# Patient Record
Sex: FEMALE | Race: WHITE | Employment: PART TIME | URBAN - NONMETROPOLITAN AREA
[De-identification: names, ages, dates, MRNs, and addresses within clinical notes are randomized per-mention and may not be internally consistent; named-entity substitution may affect disease eponyms.]

---
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
## 2019-01-14 ENCOUNTER — OFFICE VISIT (OUTPATIENT)
Dept: NEUROLOGY | Age: 19
End: 2019-01-14
Payer: COMMERCIAL

## 2019-01-14 VITALS
DIASTOLIC BLOOD PRESSURE: 68 MMHG | WEIGHT: 215 LBS | HEART RATE: 82 BPM | SYSTOLIC BLOOD PRESSURE: 118 MMHG | BODY MASS INDEX: 38.09 KG/M2 | HEIGHT: 63 IN

## 2019-01-14 DIAGNOSIS — G43.009 MIGRAINE WITHOUT AURA AND WITHOUT STATUS MIGRAINOSUS, NOT INTRACTABLE: Primary | ICD-10-CM

## 2019-01-14 PROCEDURE — G8417 CALC BMI ABV UP PARAM F/U: HCPCS | Performed by: PSYCHIATRY & NEUROLOGY

## 2019-01-14 PROCEDURE — 1036F TOBACCO NON-USER: CPT | Performed by: PSYCHIATRY & NEUROLOGY

## 2019-01-14 PROCEDURE — G8427 DOCREV CUR MEDS BY ELIG CLIN: HCPCS | Performed by: PSYCHIATRY & NEUROLOGY

## 2019-01-14 PROCEDURE — 99213 OFFICE O/P EST LOW 20 MIN: CPT | Performed by: PSYCHIATRY & NEUROLOGY

## 2019-01-14 PROCEDURE — G8484 FLU IMMUNIZE NO ADMIN: HCPCS | Performed by: PSYCHIATRY & NEUROLOGY

## 2019-01-14 RX ORDER — RIZATRIPTAN BENZOATE 10 MG/1
10 TABLET ORAL DAILY PRN
Qty: 9 TABLET | Refills: 3 | Status: SHIPPED | OUTPATIENT
Start: 2019-01-14 | End: 2019-10-18 | Stop reason: SDUPTHER

## 2019-06-04 ENCOUNTER — HOSPITAL ENCOUNTER (EMERGENCY)
Age: 19
Discharge: HOME | End: 2019-06-04
Payer: COMMERCIAL

## 2019-06-04 VITALS
TEMPERATURE: 97.8 F | WEIGHT: 210.6 LBS | HEART RATE: 90 BPM | DIASTOLIC BLOOD PRESSURE: 94 MMHG | OXYGEN SATURATION: 99 % | SYSTOLIC BLOOD PRESSURE: 148 MMHG | BODY MASS INDEX: 37.32 KG/M2 | HEIGHT: 63 IN | RESPIRATION RATE: 16 BRPM

## 2019-06-04 DIAGNOSIS — B07.0 PLANTAR WART OF RIGHT FOOT: Primary | ICD-10-CM

## 2019-06-04 PROCEDURE — 99212 OFFICE O/P EST SF 10 MIN: CPT

## 2019-06-04 PROCEDURE — 99212 OFFICE O/P EST SF 10 MIN: CPT | Performed by: NURSE PRACTITIONER

## 2019-06-04 NOTE — ED TRIAGE NOTES
AMbualtory to room 2 c/o plantar right foot pain. Reports \" $ weeks ago I stepped on plastic. Not sure if I got it out\".   Right foot examined and area that resembles a plantar wart visible

## 2019-06-04 NOTE — ED PROVIDER NOTES
[facility]       CHIEF COMPLAINT       Chief Complaint   Patient presents with    Foot Pain     \" stepped on plastic right foot  4 weeks ago and now have pain\"       Nurses Notes reviewed and I agree except as noted in the HPI. HISTORY OF PRESENT ILLNESS   Avelino Palumbo is a 22 y.o. female who presents to the urgent care center complaining of pain to the ball of the right foot. The patient states that she stepped on a piece of plastic barefooted 4 weeks ago and has had pain ever since that time. The patient states at that time it did bleed but states that she has had pain ever since that time. The patient has not taken anything for pain. The patient denies any pain at rest just with weightbearing. The patient has not taken anything for pain not done any thing for pain as well. The patient does ambulate to the room and in the hallway without any limp gait is stable. The history is provided by the patient. No  was used. Foot Pain   This is a new problem. Episode onset: 4 weeks ago. The problem occurs constantly. The problem has been gradually worsening. Nothing aggravates the symptoms. Nothing relieves the symptoms. She has tried nothing for the symptoms. The treatment provided no relief. REVIEW OF SYSTEMS     Review of Systems   Constitutional: Negative for activity change. Musculoskeletal: Negative for arthralgias, gait problem and joint swelling. Pain to the right forefoot       PAST MEDICAL HISTORY         Diagnosis Date    Headache        SURGICALHISTORY     Patient  has no past surgical history on file.     CURRENT MEDICATIONS       Discharge Medication List as of 6/4/2019  2:02 PM      CONTINUE these medications which have NOT CHANGED    Details   etonogestrel (NEXPLANON) 68 MG implant 68 mg by Subdermal route once, Subdermal, ONCE, Historical Med      rizatriptan (MAXALT) 10 MG tablet Take 1 tablet by round, and reactive to light. EOM are normal.   Neck: Normal range of motion. Cardiovascular: Normal rate. Pulmonary/Chest: Effort normal.   Musculoskeletal: Normal range of motion. She exhibits tenderness. Right foot: There is tenderness. There is normal range of motion, no bony tenderness, no swelling and normal capillary refill. Feet:    Right foot   Neurological: She is alert and oriented to person, place, and time. Skin: Skin is warm and dry. Capillary refill takes less than 2 seconds. She is not diaphoretic. Nursing note and vitals reviewed. DIAGNOSTIC RESULTS     Labs:No results found for this visit on 06/04/19. IMAGING:    No orders to display         EKG:      URGENT CARE COURSE:     Vitals:    06/04/19 1346   BP: (!) 148/94   Pulse: 90   Resp: 16   Temp: 97.8 °F (36.6 °C)   TempSrc: Temporal   SpO2: 99%   Weight: 210 lb 9.6 oz (95.5 kg)   Height: 5' 3\" (1.6 m)       Medications - No data to display         PROCEDURES:  None    FINAL IMPRESSION      1. Plantar wart of right foot          DISPOSITION/ PLAN     The patient was advised to use over-the-counter freeze away or salicylic acid as directed. The patient was advised that these can take quite some time to resolve even with treatment. She was advised to follow-up with her primary care provider for further evaluation and management of care. The patient left ambulatory without any problems.       PATIENT REFERRED TO:  Yandy Raygoza MD  [address] [place] [address] / [facility] [address]      DISCHARGE MEDICATIONS:  Discharge Medication List as of 6/4/2019  2:02 PM          Discharge Medication List as of 6/4/2019  2:02 PM      STOP taking these medications       frovatriptan succinate (FROVA) 2.5 MG TABS Comments:   Reason for Stopping:         UNABLE TO FIND Comments:   Reason for Stopping:               Discharge Medication List as of 6/4/2019  2:02 PM          WINSTON Bustamante - CNP    (Please note that portions of

## 2019-06-04 NOTE — ED NOTES
Pt verbalized discharge instructions. Pt informed to go to ER if develop chest pain, shortness of breath or abdominal pain. Pt ambulatory out in stable condition. Assessment unchanged.        Aguilar Chiu RN  06/04/19 8134

## 2019-10-21 RX ORDER — RIZATRIPTAN BENZOATE 10 MG/1
TABLET ORAL
Qty: 9 TABLET | Refills: 2 | Status: SHIPPED | OUTPATIENT
Start: 2019-10-21 | End: 2020-01-16

## 2019-12-19 ENCOUNTER — HOSPITAL ENCOUNTER (EMERGENCY)
Age: 19
Discharge: HOME | End: 2019-12-19
Payer: COMMERCIAL

## 2019-12-19 VITALS
OXYGEN SATURATION: 99 % | DIASTOLIC BLOOD PRESSURE: 77 MMHG | HEART RATE: 92 BPM | RESPIRATION RATE: 16 BRPM | WEIGHT: 216.8 LBS | SYSTOLIC BLOOD PRESSURE: 136 MMHG | BODY MASS INDEX: 38.4 KG/M2 | TEMPERATURE: 98.2 F

## 2019-12-19 DIAGNOSIS — J02.9 ACUTE PHARYNGITIS, UNSPECIFIED ETIOLOGY: Primary | ICD-10-CM

## 2019-12-19 LAB
GROUP A STREP CULTURE, REFLEX: NEGATIVE
REFLEX THROAT C + S: NORMAL

## 2019-12-19 PROCEDURE — 99213 OFFICE O/P EST LOW 20 MIN: CPT | Performed by: NURSE PRACTITIONER

## 2019-12-19 PROCEDURE — 87070 CULTURE OTHR SPECIMN AEROBIC: CPT

## 2019-12-19 PROCEDURE — 87880 STREP A ASSAY W/OPTIC: CPT

## 2019-12-19 PROCEDURE — 99213 OFFICE O/P EST LOW 20 MIN: CPT

## 2019-12-19 RX ORDER — AMOXICILLIN 500 MG/1
500 CAPSULE ORAL 3 TIMES DAILY
Qty: 30 CAPSULE | Refills: 0 | Status: SHIPPED | OUTPATIENT
Start: 2019-12-19 | End: 2019-12-29

## 2019-12-19 ASSESSMENT — ENCOUNTER SYMPTOMS
SHORTNESS OF BREATH: 0
EYE PAIN: 0
EYE REDNESS: 0
CHEST TIGHTNESS: 0
VOICE CHANGE: 1
TROUBLE SWALLOWING: 1
SORE THROAT: 1
SINUS PRESSURE: 0
NAUSEA: 1
SINUS PAIN: 0
SINUS CONGESTION: 0
ALLERGIC/IMMUNOLOGIC NEGATIVE: 1
EYE ITCHING: 0

## 2019-12-19 ASSESSMENT — PAIN DESCRIPTION - DESCRIPTORS: DESCRIPTORS: ACHING;SORE

## 2019-12-19 ASSESSMENT — PAIN DESCRIPTION - FREQUENCY: FREQUENCY: CONTINUOUS

## 2019-12-19 ASSESSMENT — PAIN DESCRIPTION - PAIN TYPE: TYPE: ACUTE PAIN

## 2019-12-19 ASSESSMENT — PAIN DESCRIPTION - LOCATION: LOCATION: EAR;THROAT

## 2019-12-19 ASSESSMENT — PAIN SCALES - GENERAL: PAINLEVEL_OUTOF10: 3

## 2019-12-21 LAB — THROAT/NOSE CULTURE: NORMAL

## 2020-01-16 ENCOUNTER — OFFICE VISIT (OUTPATIENT)
Dept: NEUROLOGY | Age: 20
End: 2020-01-16
Payer: COMMERCIAL

## 2020-01-16 VITALS
HEART RATE: 66 BPM | BODY MASS INDEX: 37.97 KG/M2 | SYSTOLIC BLOOD PRESSURE: 118 MMHG | HEIGHT: 64 IN | DIASTOLIC BLOOD PRESSURE: 62 MMHG | WEIGHT: 222.4 LBS

## 2020-01-16 PROCEDURE — G8417 CALC BMI ABV UP PARAM F/U: HCPCS | Performed by: NURSE PRACTITIONER

## 2020-01-16 PROCEDURE — 1036F TOBACCO NON-USER: CPT | Performed by: NURSE PRACTITIONER

## 2020-01-16 PROCEDURE — G8484 FLU IMMUNIZE NO ADMIN: HCPCS | Performed by: NURSE PRACTITIONER

## 2020-01-16 PROCEDURE — G8427 DOCREV CUR MEDS BY ELIG CLIN: HCPCS | Performed by: NURSE PRACTITIONER

## 2020-01-16 PROCEDURE — 99213 OFFICE O/P EST LOW 20 MIN: CPT | Performed by: NURSE PRACTITIONER

## 2020-01-16 RX ORDER — FOLIC ACID 1 MG/1
1 TABLET ORAL DAILY
Qty: 90 TABLET | Refills: 1 | Status: SHIPPED | OUTPATIENT
Start: 2020-01-16

## 2020-01-16 RX ORDER — NORGESTIMATE AND ETHINYL ESTRADIOL 0.25-0.035
1 KIT ORAL DAILY
COMMUNITY
End: 2020-06-22

## 2020-01-16 RX ORDER — SUMATRIPTAN 50 MG/1
50 TABLET, FILM COATED ORAL
Qty: 9 TABLET | Refills: 3 | Status: SHIPPED | OUTPATIENT
Start: 2020-01-16 | End: 2020-09-08

## 2020-01-16 RX ORDER — TOPIRAMATE 50 MG/1
TABLET, FILM COATED ORAL
Qty: 30 TABLET | Refills: 3 | Status: SHIPPED | OUTPATIENT
Start: 2020-01-16 | End: 2020-05-28 | Stop reason: SDUPTHER

## 2020-01-16 NOTE — PATIENT INSTRUCTIONS
1. Topamax 25 mg tablet daily for one week then 50 mg daily there after. Take with plenty of fluids. 2. Start folic acid 1 mg daily  3. Discontinue Maxalt 10 mg due to lack of benefit  4. Start Imitrex 50 mg daily as needed for headache    5. Keep headache diary  6. Follow up in 4 months or sooner if needed. 7. Call if any questions or concerns.

## 2020-01-16 NOTE — PROGRESS NOTES
is Intact. Skin: no rash, lesion, dry  to touch. warm  Head: no rash, no icterus  Neck: There is no carotid bruits. The Neck is supple. There is no neck lymphadenopathy. Neuro: CN 2-12 grossly intact with no focal deficits. Power 5/5 Throughout symmetric, Reflexes are +2 symmetric. Long tracts are intact. Cerebellar exam is Intact. Sensory exam is intact to light touch. Gait is intact. Musculoskeletal:  Has no hand arthritis, no limitation of ROM in any of the four extremities. Lower extremities no edema  The abdomen is soft,  intact bowel sounds. DATA:  Results for orders placed or performed during the hospital encounter of 12/19/19   Throat culture   Result Value Ref Range    Throat/Nose Culture Normal mona- preliminary Normal mona     Strep A culture, throat   Result Value Ref Range    REFLEX THROAT C + S INDICATED    STREP A ANTIGEN   Result Value Ref Range    GROUP A STREP CULTURE, REFLEX NEGATIVE         Results for orders placed during the hospital encounter of 06/21/16   MRI Brain WO Contrast    Narrative PROCEDURE: MRI BRAIN WO CONTRAST    CLINICAL INFORMATION: Migraine headaches. COMPARISON: No prior brain imaging for comparison. TECHNIQUE: Using a 1.5 Angela Siemens scanner, axial diffusion weighted echoplanar imaging, T2-weighted turbo spin-echo, fat-saturated T2-weighted fluid attenuated inversion recovery, and proton density weighted gradient recall images were obtained of the   entire brain. Sagittal 3-D T1 weighted volume acquisition gradient recall images were also obtained, with multiplanar reconstructions. No IV contrast administered. FINDINGS: Global brain volume is within normal limits. There are no areas of diffusion abnormality nor findings of blood products of the brain parenchyma. There is no evidence of gliosis. No parenchymal edema. Essentially normal signal characteristics of the brain parenchyma demonstrated in all pulse sequences.     Normal size as well as normal signal characteristics demonstrated in the ventricles and the extra-axial spaces. Vascular structures are grossly unremarkable. Limited detail of the cervical canal and spinal cord and sella contents all appear within normal limits. The imaged paranasal sinuses are grossly clear. Mildly prominent inferior turbinates bilaterally suggested, poorly defined. Mastoid air cells and middle ear cavities are grossly clear. Impression NORMAL MRI BRAIN WITHOUT CONTRAST. MILDLY PROMINENT INFERIOR NASAL TURBINATES BILATERALLY INCIDENTALLY NOTED. **This report has been created using voice recognition software. It may contain minor errors which are inherent in voice recognition technology. **          Final report electronically signed by Dr. Romero Cancer on 6/21/2016 12:15 PM              Assessment:     Diagnosis Orders   1. Migraine without aura and without status migrainosus, not intractable          She feels her headaches have increased in frequency and severity. Her headaches are still the same location and pattern. She is on Maxalt and does not feel it is as effective as it has been in the past. She is interested in daily preventative medication for headache. We will start her on Topamax titration for a target dose of 50 mg daily. She is is at risk of pregnancy and we will ask her to start folic acid 1 mg daily while she is on the Topamax. After detailed discussion with patient we agreed on the following plan. Plan:  1. Topamax 25 mg tablet daily for one week then 50 mg daily there after. Take with plenty of fluids. 2. Start folic acid 1 mg daily  3. Discontinue Maxalt 10 mg due to lack of benefit  4. Start Imitrex 50 mg daily as needed for headache    5. Keep headache diary  6. Follow up in 4 months or sooner if needed. 7. Call if any questions or concerns. Please call if any questions.      Danay Banks CNP

## 2020-05-28 RX ORDER — TOPIRAMATE 50 MG/1
TABLET, FILM COATED ORAL
Qty: 30 TABLET | Refills: 3 | Status: SHIPPED | OUTPATIENT
Start: 2020-05-28 | End: 2020-06-22 | Stop reason: SDUPTHER

## 2020-06-22 ENCOUNTER — VIRTUAL VISIT (OUTPATIENT)
Dept: NEUROLOGY | Age: 20
End: 2020-06-22
Payer: COMMERCIAL

## 2020-06-22 PROCEDURE — G8428 CUR MEDS NOT DOCUMENT: HCPCS | Performed by: PSYCHIATRY & NEUROLOGY

## 2020-06-22 PROCEDURE — 99213 OFFICE O/P EST LOW 20 MIN: CPT | Performed by: PSYCHIATRY & NEUROLOGY

## 2020-06-22 RX ORDER — TOPIRAMATE 50 MG/1
TABLET, FILM COATED ORAL
Qty: 30 TABLET | Refills: 5 | Status: SHIPPED | OUTPATIENT
Start: 2020-06-22

## 2020-06-22 NOTE — PATIENT INSTRUCTIONS
1. Continue with Topamax  50 mg daily. Take with plenty of fluids. Refills given  2. Continue with  folic acid 1 mg daily  3. Continue with Imitrex 50 mg daily as needed for headache    4. Keep headache diary  5. Follow up in 6 months or sooner if needed. 6. Call if any questions or concerns.

## 2020-07-06 NOTE — PROGRESS NOTES
2020    TELEHEALTH EVALUATION -- Audio/Visual (During VITRU-37 public health emergency)    HPI:    Anneliese Graham (:  2000) has requested an audio/video evaluation for the following concern(s):headache. She feels her headaches are well controlled. She is on topamax, imitrex, and is also on folic acid. She is very pleased with how she is doing. She is being evaluated virtually to discuss the plan of care going forward. Review of Systems ______________  Eyes: Negative.  ____  Respiratory: Negative.  ____  Cardiovascular: Negative.  ____  Gastrointestinal: Negative.  ________________________  Neurological: Negative.  ______________      Prior to Visit Medications    Medication Sig Taking? Authorizing Provider   topiramate (TOPAMAX) 50 MG tablet Topamax 50 mg by mouth daily. Take with plenty of fluids. WINSTON Tiwari CNP   lisdexamfetamine (VYVANSE) 30 MG capsule Take 30 mg by mouth every morning. Historical Provider, MD   norgestimate-ethinyl estradiol ([address] [address]) 0.25-35 MG-MCG per tablet Take 1 tablet by mouth daily  Historical Provider, MD   folic acid (FOLVITE) 1 MG tablet Take 1 tablet by mouth daily  WINSTON Tiwari CNP   SUMAtriptan (IMITREX) 50 MG tablet Take 1 tablet by mouth once as needed for Migraine  WINSTON Tiwari CNP   pseudoephedrine (SUDAFED 24 HOUR NON-DROWSY) 240 MG TB24 extended release tablet Take 1 tablet by mouth daily as needed (congestion) Daily for nasal congestion  Patient not taking: Reported on 2020  WINSTON Chairez CNP   loperamide (RA ANTI-DIARRHEAL) 2 MG capsule Take 2 capsule first loose stool then 1 capsule after each after. Up to 6 capsules in 24 hours.   Patient not taking: Reported on 2020  WINSTON Chairez CNP   ibuprofen (ADVIL;MOTRIN) 400 MG tablet Take 400 mg by mouth every 6 hours as needed for Pain  Historical Provider, MD       Social History     Tobacco Use    Smoking status: Passive Smoke 7 motor function) (limited exam to video visit)          [x] No gaze palsy        [] Abnormal-         Skin:        [x] No significant exanthematous lesions or discoloration noted on facial skin         [] Abnormal-            Psychiatric:       [x] Normal Affect [x] No Hallucinations        [] Abnormal-     Other pertinent observable physical exam findings-     ASSESSMENT/PLAN:  1. Migraine without aura and without status migrainosus, not intractable    She feels her headaches are well controlled. She is on topamax, imitrex, and is also on folic acid. No side effects noted to the medications. She is very pleased with how she is doing. After detailed discussion with patient we agreed on the following plan. Plan:  1. Continue with Topamax  50 mg daily. Take with plenty of fluids. Refills given  2. Continue with  folic acid 1 mg daily  3. Continue with Imitrex 50 mg daily as needed for headache    4. Keep headache diary  5. Follow up in 6 months or sooner if needed. 6. Call if any questions or concerns. Return in about 6 months (around 12/22/2020). Va Hunter is a 20 y.o. female being evaluated by a Virtual Visit (video visit) encounter to address concerns as mentioned above. A caregiver was present when appropriate. Due to this being a TeleHealth encounter (During [place] [address] public [facility] emergency), evaluation of the following organ systems was limited: Vitals/Constitutional/EENT/Resp/CV/GI//MS/Neuro/Skin/Heme-Lymph-Imm. Pursuant to the emergency declaration under the [facility], [address] [place] [address] authority and the [employer] and [employer] Act, this Virtual Visit was conducted with patient's (and/or legal guardian's) consent, to reduce the patient's risk of exposure to COVID-19 and provide necessary medical care.   The patient (and/or legal guardian) has also been advised to contact this office for worsening conditions or problems, and seek emergency medical treatment and/or call 911 if deemed necessary. Patient identification was verified at the start of the visit: Yes    Total time spent on this encounter: 15 minutes    Services were provided through a video synchronous discussion virtually to substitute for in-person clinic visit. Patient and provider were located at their individual homes. Kavita Mace MD on 6/22/2020 at 3:26 PM    An electronic signature was used to authenticate this note.

## 2020-08-05 ENCOUNTER — VIRTUAL VISIT (OUTPATIENT)
Dept: PSYCHIATRY | Age: 20
End: 2020-08-05
Payer: COMMERCIAL

## 2020-08-05 PROBLEM — F32.A DEPRESSION: Status: ACTIVE | Noted: 2020-08-05

## 2020-08-05 PROBLEM — F90.8 ADHD, ADULT RESIDUAL TYPE: Status: ACTIVE | Noted: 2020-08-05

## 2020-08-05 PROCEDURE — 1036F TOBACCO NON-USER: CPT | Performed by: PSYCHIATRY & NEUROLOGY

## 2020-08-05 PROCEDURE — 90792 PSYCH DIAG EVAL W/MED SRVCS: CPT | Performed by: PSYCHIATRY & NEUROLOGY

## 2020-08-05 RX ORDER — LISDEXAMFETAMINE DIMESYLATE 40 MG/1
40 CAPSULE ORAL DAILY
Qty: 30 CAPSULE | Refills: 0 | Status: SHIPPED | OUTPATIENT
Start: 2020-08-05 | End: 2020-09-08

## 2020-08-05 RX ORDER — LAMOTRIGINE 25 MG/1
TABLET ORAL
Qty: 42 TABLET | Refills: 0 | Status: SHIPPED | OUTPATIENT
Start: 2020-08-05

## 2020-08-05 RX ORDER — LAMOTRIGINE 200 MG/1
TABLET ORAL
Qty: 30 TABLET | Refills: 3 | Status: SHIPPED | OUTPATIENT
Start: 2020-08-05

## 2020-08-05 NOTE — PROGRESS NOTES
Chief Complaint   Patient presents with    Psychiatric Evaluation     adhd  Reyes Sexton is a 20 y.o. female being evaluated by a Virtual Visit (video visit) encounter to address concerns as mentioned above. A caregiver was present when appropriate. Due to this being a TeleHealth encounter (During DOMEA-22 public health emergency), evaluation of the following organ systems was limited: Vitals/Constitutional/EENT/Resp/CV/GI//MS/Neuro/Skin/Heme-Lymph-Imm. Pursuant to the emergency declaration under the [address] [place] [address], [address] [place] [address] authority and the [employer] and [employer] Act, this Virtual Visit was conducted with patient's (and/or legal guardian's) consent, to reduce the patient's risk of exposure to COVID-19 and provide necessary medical care. The patient (and/or legal guardian) has also been advised to contact this office for worsening conditions or problems, and seek emergency medical treatment and/or call 911 if deemed necessary. Patient identification was verified at the start of the visit: Yes    Total time spent for this encounter: Not billed by time    Services were provided through a video synchronous discussion virtually to substitute for in-person clinic visit. Patient and provider were located at their individual homes. --Yuneil Larios MD on 8/5/2020 at 3:56 PM    An electronic signature was used to authenticate this note. Bill Duval is a 70-year-old single  female college student. She was referred by somebody in Dr. Jonathan Brito office for ADHD and depression. She is currently on Vyvanse 30 mg daily, plus topiramate for migraines. She said that the ADHD was just recently diagnosed. However, she reports symptoms extending back into grade school that were simply undetected.   The likelihood is that she had enough intellectual capacity to compensate for it at the time, but now that she has reached college with the increased demands it has become more evident. She does describe typical symptoms of inattention, lack of focus, difficulty absorbing material etc.    She also thinks her depression extends back a while. Certainly it was overt while she was a teenager and she thinks even earlier around age 4 she had a bout of depression. Oftentimes early depression becomes bipolar disorder. She said the depression had really become bad last year but she forced herself through it and did not receive treatment. She said she had gone to bed for 2 weeks, had no motivation to get out of bed, did not want to do anything and was not interested in anything. She did get into some counseling that helped, but the counseling process was stopped by the [facility] when they were sent home. She normally said that she would keep her self so busy that she did not have time to think about the depression. The hypersomnia she describes is often seen in bipolar depressions. She reported decreased energy, low self-esteem, sadness and tearfulness, never feeling rested or energetic but always exhausted. She reported decreased pleasure and self-esteem, impaired concentration and memory, and would think negatively of herself. She said that she felt stupid and that she had nothing worthwhile tp offer. Libido did not change however. She said there was a time when she would go out with anyone indiscriminately and was basically promiscuous. This is punctuated by periods of a few days at a time, when mostly the opposite is true. She feels energetic, wants to get things done, goes on cleaning sprees, does not need to sleep. I did not find any grandiose symptoms but she does report irritability. Generally she removes herself from the situation so as not to harm people or the relationships. She says this happens for a few days every several months.     She has not had any treatment for the mood Depressed bipolar II disorder ([facility] [address] Yes    ADHD, adult residual type        Plan  Vyvanse to 40 mg/d  Trial lamotrigine for mood  Current Outpatient Medications   Medication Sig Dispense Refill    topiramate (TOPAMAX) 50 MG tablet Topamax 50 mg by mouth daily. Take with plenty of fluids. 30 tablet 5    folic acid (FOLVITE) 1 MG tablet Take 1 tablet by mouth daily 90 tablet 1    SUMAtriptan (IMITREX) 50 MG tablet Take 1 tablet by mouth once as needed for Migraine 9 tablet 3    pseudoephedrine (SUDAFED 24 HOUR NON-DROWSY) 240 MG TB24 extended release tablet Take 1 tablet by mouth daily as needed (congestion) Daily for nasal congestion (Patient not taking: Reported on 1/16/2020) 20 tablet 0    loperamide (RA ANTI-DIARRHEAL) 2 MG capsule Take 2 capsule first loose stool then 1 capsule after each after. Up to 6 capsules in 24 hours. (Patient not taking: Reported on 1/16/2020) 30 capsule 0    ibuprofen (ADVIL;MOTRIN) 400 MG tablet Take 400 mg by mouth every 6 hours as needed for Pain       No current facility-administered medications for this visit. Siena [place]

## 2020-09-08 ENCOUNTER — HOSPITAL ENCOUNTER (EMERGENCY)
Age: 20
Discharge: HOME | End: 2020-09-08
Payer: COMMERCIAL

## 2020-09-08 ENCOUNTER — TELEPHONE (OUTPATIENT)
Dept: PSYCHIATRY | Age: 20
End: 2020-09-08

## 2020-09-08 ENCOUNTER — VIRTUAL VISIT (OUTPATIENT)
Dept: PSYCHIATRY | Age: 20
End: 2020-09-08
Payer: COMMERCIAL

## 2020-09-08 VITALS
BODY MASS INDEX: 29.09 KG/M2 | RESPIRATION RATE: 14 BRPM | DIASTOLIC BLOOD PRESSURE: 73 MMHG | HEIGHT: 63 IN | SYSTOLIC BLOOD PRESSURE: 133 MMHG | OXYGEN SATURATION: 98 % | HEART RATE: 85 BPM | WEIGHT: 164.2 LBS | TEMPERATURE: 97.8 F

## 2020-09-08 PROCEDURE — 99212 OFFICE O/P EST SF 10 MIN: CPT | Performed by: PSYCHIATRY & NEUROLOGY

## 2020-09-08 PROCEDURE — 99212 OFFICE O/P EST SF 10 MIN: CPT

## 2020-09-08 PROCEDURE — G8417 CALC BMI ABV UP PARAM F/U: HCPCS | Performed by: PSYCHIATRY & NEUROLOGY

## 2020-09-08 PROCEDURE — 1036F TOBACCO NON-USER: CPT | Performed by: PSYCHIATRY & NEUROLOGY

## 2020-09-08 PROCEDURE — 99214 OFFICE O/P EST MOD 30 MIN: CPT | Performed by: NURSE PRACTITIONER

## 2020-09-08 PROCEDURE — G8428 CUR MEDS NOT DOCUMENT: HCPCS | Performed by: PSYCHIATRY & NEUROLOGY

## 2020-09-08 RX ORDER — SUMATRIPTAN 50 MG/1
TABLET, FILM COATED ORAL
Qty: 9 TABLET | Refills: 3 | Status: SHIPPED | OUTPATIENT
Start: 2020-09-08

## 2020-09-08 RX ORDER — BETAMETHASONE DIPROPIONATE 0.05 %
OINTMENT (GRAM) TOPICAL
Qty: 1 TUBE | Refills: 0 | Status: SHIPPED | OUTPATIENT
Start: 2020-09-08

## 2020-09-08 ASSESSMENT — ENCOUNTER SYMPTOMS
NAUSEA: 0
DIARRHEA: 0
EYE REDNESS: 0
EYE ITCHING: 0
TROUBLE SWALLOWING: 0
VOMITING: 0
COUGH: 0
ABDOMINAL PAIN: 0
SHORTNESS OF BREATH: 0
CHEST TIGHTNESS: 0

## 2020-09-08 NOTE — TELEPHONE ENCOUNTER
Per Dr. Jacki Granger: It could be lamictal it could be other causes. Probably if it is just the hands she can continue treatment and it will pass. She can use cortisone cream/benadry type OTC meds on it. Unless it looks like blisters or bleeding under the skin s with patches growing together     Did she restart the lamictal with the 25s and work up?

## 2020-09-08 NOTE — ED PROVIDER NOTES
normal.      Left Ear: External ear normal.      Mouth/Throat:      Lips: Pink. Mouth: Mucous membranes are moist.   Eyes:      Conjunctiva/sclera: Conjunctivae normal.      Right eye: Right conjunctiva is not injected. Left eye: Left conjunctiva is not injected. Pupils: Pupils are equal.   Neck:      Musculoskeletal: Normal range of motion. Cardiovascular:      Rate and Rhythm: Normal rate. Heart sounds: Normal heart sounds. Pulmonary:      Effort: Pulmonary effort is normal.      Breath sounds: Normal breath sounds. No decreased breath sounds, wheezing or rhonchi. Skin:     General: Skin is warm and dry. Findings: Rash (Consistent with a contact dermatitis on bilateral arms) present. Neurological:      Mental Status: She is alert and oriented to person, place, and time. Psychiatric:         Speech: Speech normal.         Behavior: Behavior is cooperative. DIAGNOSTIC RESULTS   Labs:  Abnormal Labs Reviewed - No data to display     IMAGING:  No orders to display     URGENT CARE COURSE:     Vitals:    09/08/20 1516   BP: 133/73   Pulse: 85   Resp: 14   Temp: 97.8 °F (36.6 °C)   SpO2: 98%   Weight: 164 lb 3.2 oz (74.5 kg)   Height: 5' 3\" (1.6 m)       Medications - No data to display  PROCEDURES:  FINALIMPRESSION      1. Rash and other nonspecific skin eruption        DISPOSITION/PLAN   DISPOSITION    Discharge   Physical assessment findings, diagnostic testing(s) if applicable, and vital signs reviewed with patient/patient representative. Questions answered. If applicable, patient/patient representative will be contacted upon receipt of final culture and sensitivity or other testing results when available. Any additions or changes to medications or changes the plan of care will be made at that time. Medications as directed, including OTC medications for supportive care. Education provided on medications.   Differential diagnosis(s) discussed with patient/patient representative. Does not appear as scabies or shingles. Home care/self care instructions reviewed with patient/patient representative. Patient is to follow-up with family care provider in 2-3 days if no improvement. Patient is to go to the emergency department if symptoms worsen. Patient/patient representative is aware of care plan, questions answered, verbalizes understanding and is in agreement. Teach back method used for patient/patient representative teaching(s) and printed instructions attached to after visit summary. Problem List Items Addressed This Visit     None      Visit Diagnoses     Rash and other nonspecific skin eruption    -  Primary    Relevant Medications    betamethasone dipropionate (DIPROLENE) 0.05 % ointment          PATIENT REFERRED TO:  Yeny Orozco MD  [place] [address]  [phone]    Schedule an appointment as soon as possible for a visit in 3 days  For further evaluation. , If symptoms change/worsen, go to the [facility]  [address] [facility]  [phone]    as needed, If symptoms change/worsen, go to the [address] [facility], [address], APRN - CNP  09/08/20 7156

## 2020-09-08 NOTE — TELEPHONE ENCOUNTER
Patient reports that she is just on the starting dose of Lamictal 25mg/daily at this time. She said that she would discuss further at her appt as well.

## 2020-09-08 NOTE — TELEPHONE ENCOUNTER
Patient called into the office this morning stating that she is experiencing little bumps on parts of her hands and she feels it is due to her lamictal medication. She said that she was fine with starting it the first time around but then she moved off to college and she said that her medications \"kind of fell threw the cracks\" and she restarted her medications; she said that when she started the Lamictal medication back up is when she was getting these little bumps. She said that it is just on her hands and no where else. She has an appt scheduled for today at 240pm in which she confirmed this virtual appt; I informed her that I would put this information prior to her appt to discuss then as well. She understood.

## 2020-09-08 NOTE — PROGRESS NOTES
Chief Complaint   Patient presents with    1 Month Follow-Up     Bipolar II ADHD   Elby Sacks is a 20 y.o. female being evaluated by a Virtual Visit (video visit) encounter to address concerns as mentioned above. A caregiver was present when appropriate. Due to this being a TeleHealth encounter (During [facility] public health emergency), evaluation of the following organ systems was limited: Vitals/Constitutional/EENT/Resp/CV/GI//MS/Neuro/Skin/Heme-Lymph-Imm. Pursuant to the emergency declaration under the [facility], [address] [place] [address] authority and the [employer] Act, this Virtual Visit was conducted with patient's (and/or legal guardian's) consent, to reduce the patient's risk of exposure to COVID-19 and provide necessary medical care. The patient (and/or legal guardian) has also been advised to contact this office for worsening conditions or problems, and seek emergency medical treatment and/or call 911 if deemed necessary. Patient identification was verified at the start of the visit: Yes    Total time spent for this encounter: Not billed by time    Services were provided through a video synchronous discussion virtually to substitute for in-person clinic visit. Patient and provider were located at their individual homes. --Sendy Campos MD on 9/8/2020 at 2:54 PM    An electronic signature was used to authenticate this note. Hannah Mccurdy was interviewed by video madelaine for this appointment. Last time we had started lamotrigine. She has noticed a rash on her hands, and now elbows over the past few days. She says it is nonpruritic. I was able to see it to some degree on video, and it certainly does not look like any Mcmillan-Sanket that I have seen. I suspect that it may be something like contagion molluscum. I recommended that she see her regular physician about this.     I do not think there is any reason to discontinue the lamotrigine. She has been a little bit erratic about dosing, and she is still titrating. She is on 50 mg a day, but she said she forgot to take it for about a week and then resumed at 48. I told her that if one misses more than 5 days the titration is supposed to start over, and that the risk is of increasing the rash risk. Most of the rashes are still benign ones. I will plan to follow-up with her in 1 month's time. Hopefully she will find out what this is caused by. Mental Status Examination    Level of consciousness:  within normal limits  Appearance:  well-appearing and rash on hands, elbows  Behavior/Motor:  no abnormalities noted  Attitude toward examiner:  cooperative, attentive and good eye contact  Speech:  spontaneous, normal rate and normal volume  Mood:  euthymic  Affect:  mood congruent  Thought processes:  linear, goal directed and coherent  Thought content:  Homocidal ideation: none  Suicidal Ideation:  denies suicidal ideation  Delusions:  nonne  Perceptual Disturbance:  denies any perceptual disturbance  Cognition:  oriented to person, place, and time  Concentration succeeded  Memory intact  Fund of knowledge:  good  Abstract thinking:  good  Insight:  good  Judgment:  good  Anxiety:   Generalized: No  Excessive Worry: No  Panic Attacks: No  Frequency:  Housebound: No   Obsession: No   Compulsion: No  Flashbacks:No  Nightmares No  Hyperarousal No     DIAGNOSTIC IMPRESSION  Bipolar II dep  ADHD  . Plan  See regular MD for outbreak  Current Outpatient Medications   Medication Sig Dispense Refill    SUMAtriptan (IMITREX) 50 MG tablet TAKE 1 TABLET BY MOUTH ONCE DAILY AS NEEDED FOR MIGRAINE HEADACHE 9 tablet 3    Lisdexamfetamine Dimesylate (VYVANSE) 40 MG CAPS Take 40 mg by mouth daily for 30 days. 30 capsule 0    lamoTRIgine (LAMICTAL) 25 MG tablet Start with these. Take 1 by mouth daily for two weeks, then increase to 2 daily until gone.  Next step is a larger

## 2022-01-12 ENCOUNTER — NURSE ONLY (OUTPATIENT)
Dept: LAB | Age: 22
End: 2022-01-12

## 2022-01-15 LAB
APTIMA MEDIA TYPE: NORMAL
CHLAMYDIA TRACHOMATIS AMPLIFIED DET: NEGATIVE
NEISSERIA GONORRHOEAE BY AMP: NEGATIVE
SPECIMEN SOURCE: NORMAL
T. VAGINALIS SPECIMEN SOURCE: NORMAL
TRICHOMONAS VAGINALIS BY NAA: NEGATIVE